# Patient Record
Sex: FEMALE | Race: WHITE | Employment: FULL TIME | URBAN - METROPOLITAN AREA
[De-identification: names, ages, dates, MRNs, and addresses within clinical notes are randomized per-mention and may not be internally consistent; named-entity substitution may affect disease eponyms.]

---

## 2017-07-26 ENCOUNTER — ED HISTORICAL/CONVERTED ENCOUNTER (OUTPATIENT)
Dept: OTHER | Age: 32
End: 2017-07-26

## 2017-09-25 ENCOUNTER — OFFICE VISIT (OUTPATIENT)
Dept: ENDOCRINOLOGY | Age: 32
End: 2017-09-25

## 2017-09-25 VITALS
DIASTOLIC BLOOD PRESSURE: 96 MMHG | HEIGHT: 71 IN | BODY MASS INDEX: 22.02 KG/M2 | HEART RATE: 77 BPM | TEMPERATURE: 98.2 F | WEIGHT: 157.3 LBS | OXYGEN SATURATION: 98 % | RESPIRATION RATE: 16 BRPM | SYSTOLIC BLOOD PRESSURE: 137 MMHG

## 2017-09-25 DIAGNOSIS — I15.9 SECONDARY HYPERTENSION: Primary | ICD-10-CM

## 2017-09-25 DIAGNOSIS — R79.89 ABNORMAL THYROID BLOOD TEST: ICD-10-CM

## 2017-09-25 RX ORDER — FLUVOXAMINE MALEATE 150 MG/1
CAPSULE, EXTENDED RELEASE ORAL
Refills: 2 | Status: ON HOLD | COMMUNITY
Start: 2017-08-29 | End: 2018-09-13

## 2017-09-25 RX ORDER — NORETHINDRONE 0.35 MG
KIT ORAL
Refills: 11 | COMMUNITY
Start: 2017-09-19

## 2017-09-25 RX ORDER — DESVENLAFAXINE SUCCINATE 50 MG/1
TABLET, EXTENDED RELEASE ORAL
Refills: 1 | COMMUNITY
Start: 2017-08-29

## 2017-09-25 NOTE — PROGRESS NOTES
Ivan Moise is a 36 y.o. female here for   Chief Complaint   Patient presents with    New Patient     referred by Dr. Gwynneth Osler for Thyroid issues       1. Have you been to the ER, urgent care clinic since your last visit? Hospitalized since your last visit? -n/a    2. Have you seen or consulted any other health care providers outside of the [address] [place] [address] since your last visit?   Include any pap smears or colon screening.-n/a    Wt Readings from Last 3 Encounters:   No data found for Wt     Temp Readings from Last 3 Encounters:   No data found for Temp     BP Readings from Last 3 Encounters:   No data found for BP     Pulse Readings from Last 3 Encounters:   No data found for Pulse     Order placed for pt per verbal order with read back from Dr. Kanu Walker 09/25/17

## 2017-09-25 NOTE — PROGRESS NOTES
[facility]               Balaji Forrest MD        [address] 78 [phone] Fax [phone]       Patient Information  Date:9/28/2017  Name : Mendel Bamberger 36 y.o.     YOB: 1985         Referred by: Comfort Calderon NP       Chief Complaint   Patient presents with   [facility] New Patient     referred by Dr. Darek Gaviria for Thyroid issues       History of Present Illness: Mendel Bamberger is a 36 y.o. female     She was referred by Comfort Calderon NP for evaluation and management of (thyroid disorder). She went to the ER for very high blood pressure associated with headache, palpitations and chest tightness. Her blood pressure was 180/110. She has not had any prior blood pressure issues. She has underlying bipolar disorder and is on multiple medications. She was also on estrogen birth control pills which has been recently changed to just the progesterone based pill. She was evaluated by Dr. Taye Garcia for evaluation of secondary hypertension. She had plasma metanephrines, plasma catecholamines which were negative, renin was 5 and aldosterone was 22 and drawn at 10:00 AM. No hypokalemia. Her morning cortisol was 18. Reportedly had doppler/renal ultrasound and she does not know the results. She was told she had abnormal thyroid function tests, the labs which were drawn by Dr. Tyae Garcia show normal TSH. She thinks it may be 5. A year ago she was told she had abnormal thyroid function tests which have normalized. She was wondering if her symptoms are related to her thyroid condition. She describes the symptoms as a panic attack. No family history of adrenal disorders. She denies licorice intake.        Past Medical History:   Diagnosis Date    Bipolar disorder [facility])      Past Surgical History:   Procedure Laterality Date    HX OTHER SURGICAL      birth edgar removal     Current Outpatient Prescriptions   Medication Sig    [facility] 150 mg CR cap TK 1 C PO QAM THEN TK 2 CS PO AT BEDTIME    desvenlafaxine succinate (PRISTIQ) 50 mg ER tablet TK 1 T PO D    NORLYDA 0.35 mg tab TK 1 T PO QD     No current facility-administered medications for this visit. Allergies   Allergen Reactions    Codeine-Cpm-Pe-Acetaminophen Other (comments)         Review of Systems:  All 10 systems reviewed and are negative other than mentioned in HPI    Physical Examination:  Blood pressure (!) 137/96, pulse 77, temperature 98.2 °F (36.8 °C), temperature source Oral, resp. rate 16, height 5' 10.5\" (1.791 m), weight 157 lb 4.8 oz (71.4 kg), SpO2 98 %. Body mass index is 22.25 kg/(m^2). - General: pleasant, no distress, good eye contact  - HEENT: no exopthalmos, no periorbital edema, no scleral/conjunctival injection, EOMI, no lid lag or stare  - Neck: supple, no thyromegaly, lymph nodes, or carotid bruits, no supraclavicular or dorsocervical fat pads  - Cardiovascular: regular, normal rate, normal S1 and S2,   - Respiratory: clear to auscultation bilaterally  - Gastrointestinal: soft, nontender, nondistended,BS +  - Musculoskeletal: no proximal muscle weakness in upper or lower extremities  - Integumentary:  no rashes, no edema  - Neurological: Alert and oriented, no tremor  - Psychiatric: normal mood and affect    Data Reviewed:     Assessment/Plan:     1. Secondary hypertension    2. Abnormal thyroid blood test      She was referred here for abnormal thyroid function tests, I do not have any labs from Myrna Taylor NP. The TSH from August 2017 was normal. I do not think thyroid is the cause for her symptoms. Her weight is fluctuating. Clinically, she does not have any consistent hypo or hyper thyroid symptoms. Check TSH along with free T4 and TPO. Also, free T4 assay scan may be affected by the psychotropic medications. Hypertension:  Being worked up as secondary hypertension, plasma metanephrines and catecholamines have been negative.  This is one of the most sensitive tests and even being on mood meds if it is negative, then pheochromocytoma has been ruled out. Renin seems to be higher, aldosterone is 22, ( She is not on any diuretics)  may have secondary hyperaldosteronism, suggesting renovascular hypertension. We will get Dr. López Brod advice on this. Had renal ultrasound with dopplers, requested the report. Agree with changing estrogen birth control pills to progestin based pills. Other etiology for secondary hyperaldosteronism is coarctation aorta. If all the workup has been negative and she continues to have hypertension, that needs to be ruled out. Thank you for allowing me to participate in the care of this patient. Tor Harley MD      There are no Patient Instructions on file for this visit.   Follow-up Disposition: Not on File        Patient verbalized understanding

## 2017-09-25 NOTE — MR AVS SNAPSHOT
Visit Information Date & Time Provider Department Dept. Phone Encounter #  
 9/25/2017  3:00 PM Chrissie Palacio MD [facility] [phone] [id number] Upcoming Health Maintenance Date Due DTaP/Tdap/Td series (1 - Tdap) 12/4/2008 PAP AKA CERVICAL CYTOLOGY 12/4/2008 INFLUENZA AGE 9 TO ADULT 8/1/2017 Allergies as of 9/25/2017  Review Complete On: 9/25/2017 By: Chrissie Palacio MD  
  
 Severity Noted Reaction Type Reactions Codeine-cpm-pe-acetaminophen  09/25/2017    Other (comments) Current Immunizations  Never Reviewed No immunizations on file. Not reviewed this visit You Were Diagnosed With   
  
 Codes Comments Secondary hypertension    -  Primary ICD-10-CM: I15.9 ICD-9-CM: 405.99 Vitals BP Pulse Temp Resp Height(growth percentile) Weight(growth percentile) (!) 137/96 (BP 1 Location: Left arm, BP Patient Position: Sitting) 77 98.2 °F (36.8 °C) (Oral) 16 5' 10.5\" (1.791 m) 157 lb 4.8 oz (71.4 kg) SpO2 BMI OB Status Smoking Status 98% 22.25 kg/m2 Having regular periods Never Smoker BMI and BSA Data Body Mass Index Body Surface Area  
 22.25 kg/m 2 1.88 m 2 Preferred Pharmacy Pharmacy Name Phone [facility], [address] [place] [address] [phone] Your Updated Medication List  
  
   
This list is accurate as of: 9/25/17  4:02 PM.  Always use your most recent med list.  
  
  
  
  
 desvenlafaxine succinate 50 mg ER tablet Commonly known as:  PRISTIQ TK 1 T PO D  
  
 fluvoxaMINE 150 mg CR cap Commonly known as:  Shonda Shines TK 1 C PO QAM THEN TK 2 CS PO AT BEDTIME  
  
 NORLYDA 0.35 mg Tab Generic drug:  norethindrone TK 1 T PO QD Introducing [facility]!    
 Munir Hughes introduces [facility] patient portal. Now you can access parts of your medical record, email your doctor's office, and request medication refills online. 1. In your internet browser, go to [email or web address] 2. Click on the First Time User? Click Here link in the Sign In box. You will see the New Member Sign Up page. 3. Enter your [employer]find Access Code exactly as it appears below. You will not need to use this code after youve completed the sign-up process. If you do not sign up before the expiration date, you must request a new code. · [employer]find Access Code: [email or web address] Expires: 12/24/2017  4:02 PM 
 
4. Enter the last four digits of your Social Security Number (xxxx) and Date of Birth (mm/dd/yyyy) as indicated and click Submit. You will be taken to the next sign-up page. 5. Create a Profind ID. This will be your Profind login ID and cannot be changed, so think of one that is secure and easy to remember. 6. Create a [employer]find password. You can change your password at any time. 7. Enter your Password Reset Question and Answer. This can be used at a later time if you forget your password. 8. Enter your e-mail address. You will receive e-mail notification when new information is available in [address] 9. Click Sign Up. You can now view and download portions of your medical record. 10. Click the Download Summary menu link to download a portable copy of your medical information. If you have questions, please visit the Frequently Asked Questions section of the Profind website. Remember, [employer]find is NOT to be used for urgent needs. For medical emergencies, dial 911. Now available from your iPhone and Android! Please provide this summary of care documentation to your next provider. Your primary care clinician is listed as Qi Bledsoe. If you have any questions after today's visit, please call [phone].

## 2017-09-26 LAB
T4 FREE SERPL-MCNC: 1.42 NG/DL (ref 0.82–1.77)
THYROPEROXIDASE AB SERPL-ACNC: 12 IU/ML (ref 0–34)
TSH SERPL DL<=0.005 MIU/L-ACNC: 1.45 UIU/ML (ref 0.45–4.5)

## 2017-09-28 PROBLEM — R79.89 ABNORMAL THYROID BLOOD TEST: Status: ACTIVE | Noted: 2017-09-28

## 2018-08-27 NOTE — PERIOP NOTES
[address] [facility], [address]    MAIN OR [phone]    MAIN PRE OP [phone]    AMBULATORY PRE OP [phone]    PRE-ADMISSION TESTING [phone]       Surgery Date:   Thursday 9/13/18      Is surgery arrival time given by surgeon? NO  If NO, [facility] staff will call you between 3 and 7pm the day before your surgery with your arrival time. (If your surgery is on a Monday, we will call you the Friday before.)    Call [phone] after 7pm Monday-Friday if you did not receive your arrival time.     Answers to Common Questions   When You  Arrive   Arrive at the [facility] [address] [facility] on the day of your surgery  Have your insurance card, photo ID, and any copayment (if needed)     Food   and   Drink NO food or drink after midnight the night before surgery    This means NO water, gum, mints, coffee, juice, etc.  No alcohol (beer, wine, liquor) 24 hours before and after surgery     Medicine to   TAKE   Morning of Surgery   MEDICATIONS TO TAKE THE MORNING OF SURGERY WITH A SIP OF WATER:    ***     Medicine  To  STOP FOR PAIN   NO Aspirin for pain    NO Non-Steroidal Anti-Inflammatory Drugs (NSAIDs:   for example, Ibuprofen (Advil, Motrin), Naproxen (Aleve)   You can take Tylenol  follow instructions on the bottle   STOP herbal supplements and vitamins 1 week before surgery     Blood  Thinners    If you take Aspirin, Plavix, Coumadin, blood-thinning or anti-clot medicine, talk to your surgeon and/or follow the instructions from the doctor who told you to take that medicine     Clothing  Jewelry  Valuables  Bathing   CLOTHING   Wear loose, comfortable clothes   Wear glasses instead of contacts   Leave money, jewelry and valuables at home   No make-up, particularly mascara, the day of surgery   REMOVE ALL piercings, rings, and jewelry - leave at home   Wear hair loose or down; no pony-tails, buns, or metal hair clips    BATHING   Follow all special bath instructions (for total joint replacement, spine and bowel surgeries.)   If you shower the morning of surgery, please do not apply any lotions, powders, or deodorants afterwards. Do not shave or trim anywhere 24 hours before surgery. Going Home  or  Spending the Night    SAME-DAY SURGERY: You must have a responsible adult drive you home and stay with you 24 hours after surgery   ADMITS: If your doctor is keeping you into the hospital after surgery, leave personal belongings/luggage in your car until you have a hospital room number. Hospital discharge time is 12 noon  Drivers must be here before 12 noon unless you are told differently         Follow all instructions so your surgery wont be cancelled. Please, be on time. If a situation occurs and you are delayed the day of surgery, call [phone] or [phone]. If your physical condition changes (like a fever, cold, flu, etc.) call your surgeon as soon as possible. The Preadmission Testing staff can be reached at [phone]. OTHER SPECIAL INSTRUCTIONS:  Free  parking 7a-5p. The patient was contacted  via phone. She  verbalize  understanding of all instructions and does not  need reinforcement.

## 2018-09-12 ENCOUNTER — ANESTHESIA EVENT (OUTPATIENT)
Dept: SURGERY | Age: 33
End: 2018-09-12
Payer: SELF-PAY

## 2018-09-13 ENCOUNTER — HOSPITAL ENCOUNTER (OUTPATIENT)
Age: 33
Setting detail: OUTPATIENT SURGERY
Discharge: HOME | End: 2018-09-13
Attending: SURGERY | Admitting: SURGERY
Payer: SELF-PAY

## 2018-09-13 ENCOUNTER — ANESTHESIA (OUTPATIENT)
Dept: SURGERY | Age: 33
End: 2018-09-13
Payer: SELF-PAY

## 2018-09-13 VITALS
RESPIRATION RATE: 16 BRPM | WEIGHT: 155 LBS | DIASTOLIC BLOOD PRESSURE: 55 MMHG | OXYGEN SATURATION: 98 % | HEART RATE: 85 BPM | SYSTOLIC BLOOD PRESSURE: 121 MMHG | TEMPERATURE: 98.2 F | HEIGHT: 71 IN | BODY MASS INDEX: 21.7 KG/M2

## 2018-09-13 LAB — HCG UR QL: NEGATIVE

## 2018-09-13 PROCEDURE — 77030011825 HC SUPP SURG PSTOP S2SG -B: Performed by: SURGERY

## 2018-09-13 PROCEDURE — 77030002933 HC SUT MCRYL J&J -A: Performed by: SURGERY

## 2018-09-13 PROCEDURE — 77030008463 HC STPLR SKN PROX J&J -B: Performed by: SURGERY

## 2018-09-13 PROCEDURE — 74011250636 HC RX REV CODE- 250/636

## 2018-09-13 PROCEDURE — 77030039266 HC ADH SKN EXOFIN S2SG -A: Performed by: SURGERY

## 2018-09-13 PROCEDURE — 76210000026 HC REC RM PH II 1 TO 1.5 HR: Performed by: SURGERY

## 2018-09-13 PROCEDURE — 77030002924 HC SUT GORTX WLGO -B: Performed by: SURGERY

## 2018-09-13 PROCEDURE — 74011000272 HC RX REV CODE- 272: Performed by: SURGERY

## 2018-09-13 PROCEDURE — 76010000149 HC OR TIME 1 TO 1.5 HR: Performed by: SURGERY

## 2018-09-13 PROCEDURE — 74011000250 HC RX REV CODE- 250: Performed by: SURGERY

## 2018-09-13 PROCEDURE — 77030018836 HC SOL IRR NACL ICUM -A: Performed by: SURGERY

## 2018-09-13 PROCEDURE — 76210000063 HC OR PH I REC FIRST 0.5 HR: Performed by: SURGERY

## 2018-09-13 PROCEDURE — 77030016570 HC BLNKT BAIR HGGR 3M -B: Performed by: SURGERY

## 2018-09-13 PROCEDURE — 74011250637 HC RX REV CODE- 250/637: Performed by: SURGERY

## 2018-09-13 PROCEDURE — 74011250636 HC RX REV CODE- 250/636: Performed by: SURGERY

## 2018-09-13 PROCEDURE — 77030011283 HC ELECTRD NDL COVD -A: Performed by: SURGERY

## 2018-09-13 PROCEDURE — 77030032490 HC SLV COMPR SCD KNE COVD -B: Performed by: SURGERY

## 2018-09-13 PROCEDURE — 77030020262 HC SOL INJ SOD CL 0.9% 100ML: Performed by: SURGERY

## 2018-09-13 PROCEDURE — 74011000258 HC RX REV CODE- 258: Performed by: SURGERY

## 2018-09-13 PROCEDURE — 74011250636 HC RX REV CODE- 250/636: Performed by: ANESTHESIOLOGY

## 2018-09-13 PROCEDURE — 74011250637 HC RX REV CODE- 250/637: Performed by: ANESTHESIOLOGY

## 2018-09-13 PROCEDURE — 77030026227 HC FUNL KELLR 2 PCH ALGN -C: Performed by: SURGERY

## 2018-09-13 PROCEDURE — 77030011264 HC ELECTRD BLD EXT COVD -A: Performed by: SURGERY

## 2018-09-13 PROCEDURE — 77030008684 HC TU ET CUF COVD -B: Performed by: ANESTHESIOLOGY

## 2018-09-13 PROCEDURE — 77030003592 HC NDL KEITH ASPN -A: Performed by: SURGERY

## 2018-09-13 PROCEDURE — 81025 URINE PREGNANCY TEST: CPT

## 2018-09-13 PROCEDURE — 77030002966 HC SUT PDS J&J -A: Performed by: SURGERY

## 2018-09-13 PROCEDURE — 77030020782 HC GWN BAIR PAWS FLX 3M -B

## 2018-09-13 PROCEDURE — 76060000033 HC ANESTHESIA 1 TO 1.5 HR: Performed by: SURGERY

## 2018-09-13 PROCEDURE — 74011000250 HC RX REV CODE- 250

## 2018-09-13 DEVICE — NATRELLE INSPIRA STY SRM-445 MODERATE PROFILE  SMOOTH ROUND SILICONE
Type: IMPLANTABLE DEVICE | Site: BREAST | Status: FUNCTIONAL
Brand: NATRELLE INSPIRA BREAST IMPLANTS

## 2018-09-13 RX ORDER — SODIUM CHLORIDE 0.9 % (FLUSH) 0.9 %
5-10 SYRINGE (ML) INJECTION EVERY 8 HOURS
Status: DISCONTINUED | OUTPATIENT
Start: 2018-09-13 | End: 2018-09-13 | Stop reason: HOSPADM

## 2018-09-13 RX ORDER — SODIUM CHLORIDE 0.9 % (FLUSH) 0.9 %
5-10 SYRINGE (ML) INJECTION AS NEEDED
Status: DISCONTINUED | OUTPATIENT
Start: 2018-09-13 | End: 2018-09-13 | Stop reason: HOSPADM

## 2018-09-13 RX ORDER — DEXAMETHASONE SODIUM PHOSPHATE 4 MG/ML
INJECTION, SOLUTION INTRA-ARTICULAR; INTRALESIONAL; INTRAMUSCULAR; INTRAVENOUS; SOFT TISSUE AS NEEDED
Status: DISCONTINUED | OUTPATIENT
Start: 2018-09-13 | End: 2018-09-13 | Stop reason: HOSPADM

## 2018-09-13 RX ORDER — MIDAZOLAM HYDROCHLORIDE 1 MG/ML
INJECTION, SOLUTION INTRAMUSCULAR; INTRAVENOUS AS NEEDED
Status: DISCONTINUED | OUTPATIENT
Start: 2018-09-13 | End: 2018-09-13 | Stop reason: HOSPADM

## 2018-09-13 RX ORDER — CEFAZOLIN SODIUM/WATER 2 G/20 ML
2 SYRINGE (ML) INTRAVENOUS ONCE
Status: COMPLETED | OUTPATIENT
Start: 2018-09-13 | End: 2018-09-13

## 2018-09-13 RX ORDER — BUPROPION HYDROCHLORIDE 150 MG/1
150 TABLET ORAL
COMMUNITY

## 2018-09-13 RX ORDER — BUPIVACAINE HYDROCHLORIDE AND EPINEPHRINE 5; 5 MG/ML; UG/ML
INJECTION, SOLUTION EPIDURAL; INTRACAUDAL; PERINEURAL AS NEEDED
Status: DISCONTINUED | OUTPATIENT
Start: 2018-09-13 | End: 2018-09-13 | Stop reason: HOSPADM

## 2018-09-13 RX ORDER — LIDOCAINE HYDROCHLORIDE 10 MG/ML
0.1 INJECTION, SOLUTION EPIDURAL; INFILTRATION; INTRACAUDAL; PERINEURAL AS NEEDED
Status: DISCONTINUED | OUTPATIENT
Start: 2018-09-13 | End: 2018-09-13 | Stop reason: HOSPADM

## 2018-09-13 RX ORDER — PROPOFOL 10 MG/ML
INJECTION, EMULSION INTRAVENOUS AS NEEDED
Status: DISCONTINUED | OUTPATIENT
Start: 2018-09-13 | End: 2018-09-13 | Stop reason: HOSPADM

## 2018-09-13 RX ORDER — ONDANSETRON 2 MG/ML
4 INJECTION INTRAMUSCULAR; INTRAVENOUS AS NEEDED
Status: DISCONTINUED | OUTPATIENT
Start: 2018-09-13 | End: 2018-09-13 | Stop reason: HOSPADM

## 2018-09-13 RX ORDER — FENTANYL CITRATE 50 UG/ML
INJECTION, SOLUTION INTRAMUSCULAR; INTRAVENOUS AS NEEDED
Status: DISCONTINUED | OUTPATIENT
Start: 2018-09-13 | End: 2018-09-13 | Stop reason: HOSPADM

## 2018-09-13 RX ORDER — SODIUM CHLORIDE, SODIUM LACTATE, POTASSIUM CHLORIDE, CALCIUM CHLORIDE 600; 310; 30; 20 MG/100ML; MG/100ML; MG/100ML; MG/100ML
125 INJECTION, SOLUTION INTRAVENOUS CONTINUOUS
Status: DISCONTINUED | OUTPATIENT
Start: 2018-09-13 | End: 2018-09-13 | Stop reason: HOSPADM

## 2018-09-13 RX ORDER — GLYCOPYRROLATE 0.2 MG/ML
INJECTION INTRAMUSCULAR; INTRAVENOUS AS NEEDED
Status: DISCONTINUED | OUTPATIENT
Start: 2018-09-13 | End: 2018-09-13 | Stop reason: HOSPADM

## 2018-09-13 RX ORDER — POVIDONE-IODINE 10 %
SOLUTION, NON-ORAL TOPICAL AS NEEDED
Status: DISCONTINUED | OUTPATIENT
Start: 2018-09-13 | End: 2018-09-13 | Stop reason: HOSPADM

## 2018-09-13 RX ORDER — ONDANSETRON 2 MG/ML
INJECTION INTRAMUSCULAR; INTRAVENOUS AS NEEDED
Status: DISCONTINUED | OUTPATIENT
Start: 2018-09-13 | End: 2018-09-13 | Stop reason: HOSPADM

## 2018-09-13 RX ORDER — SODIUM CHLORIDE, SODIUM LACTATE, POTASSIUM CHLORIDE, CALCIUM CHLORIDE 600; 310; 30; 20 MG/100ML; MG/100ML; MG/100ML; MG/100ML
100 INJECTION, SOLUTION INTRAVENOUS CONTINUOUS
Status: DISCONTINUED | OUTPATIENT
Start: 2018-09-13 | End: 2018-09-13 | Stop reason: HOSPADM

## 2018-09-13 RX ORDER — OXYCODONE AND ACETAMINOPHEN 5; 325 MG/1; MG/1
2 TABLET ORAL
Status: COMPLETED | OUTPATIENT
Start: 2018-09-13 | End: 2018-09-13

## 2018-09-13 RX ORDER — DIPHENHYDRAMINE HYDROCHLORIDE 50 MG/ML
12.5 INJECTION, SOLUTION INTRAMUSCULAR; INTRAVENOUS AS NEEDED
Status: DISCONTINUED | OUTPATIENT
Start: 2018-09-13 | End: 2018-09-13 | Stop reason: HOSPADM

## 2018-09-13 RX ORDER — SCOLOPAMINE TRANSDERMAL SYSTEM 1 MG/1
1 PATCH, EXTENDED RELEASE TRANSDERMAL
Status: DISCONTINUED | OUTPATIENT
Start: 2018-09-13 | End: 2018-09-13 | Stop reason: HOSPADM

## 2018-09-13 RX ORDER — NEOSTIGMINE METHYLSULFATE 1 MG/ML
INJECTION INTRAVENOUS AS NEEDED
Status: DISCONTINUED | OUTPATIENT
Start: 2018-09-13 | End: 2018-09-13 | Stop reason: HOSPADM

## 2018-09-13 RX ORDER — EPHEDRINE SULFATE 50 MG/ML
INJECTION, SOLUTION INTRAVENOUS AS NEEDED
Status: DISCONTINUED | OUTPATIENT
Start: 2018-09-13 | End: 2018-09-13 | Stop reason: HOSPADM

## 2018-09-13 RX ORDER — HYDROMORPHONE HYDROCHLORIDE 1 MG/ML
.25-1 INJECTION, SOLUTION INTRAMUSCULAR; INTRAVENOUS; SUBCUTANEOUS
Status: DISCONTINUED | OUTPATIENT
Start: 2018-09-13 | End: 2018-09-13 | Stop reason: HOSPADM

## 2018-09-13 RX ORDER — ROCURONIUM BROMIDE 10 MG/ML
INJECTION, SOLUTION INTRAVENOUS AS NEEDED
Status: DISCONTINUED | OUTPATIENT
Start: 2018-09-13 | End: 2018-09-13 | Stop reason: HOSPADM

## 2018-09-13 RX ORDER — LIDOCAINE HYDROCHLORIDE 20 MG/ML
INJECTION, SOLUTION EPIDURAL; INFILTRATION; INTRACAUDAL; PERINEURAL AS NEEDED
Status: DISCONTINUED | OUTPATIENT
Start: 2018-09-13 | End: 2018-09-13 | Stop reason: HOSPADM

## 2018-09-13 RX ADMIN — FENTANYL CITRATE 50 MCG: 50 INJECTION, SOLUTION INTRAMUSCULAR; INTRAVENOUS at 08:21

## 2018-09-13 RX ADMIN — ROCURONIUM BROMIDE 40 MG: 10 INJECTION, SOLUTION INTRAVENOUS at 08:06

## 2018-09-13 RX ADMIN — ROCURONIUM BROMIDE 10 MG: 10 INJECTION, SOLUTION INTRAVENOUS at 08:33

## 2018-09-13 RX ADMIN — LIDOCAINE HYDROCHLORIDE 40 MG: 20 INJECTION, SOLUTION EPIDURAL; INFILTRATION; INTRACAUDAL; PERINEURAL at 08:06

## 2018-09-13 RX ADMIN — PROPOFOL 200 MG: 10 INJECTION, EMULSION INTRAVENOUS at 08:06

## 2018-09-13 RX ADMIN — GLYCOPYRROLATE 0.2 MG: 0.2 INJECTION INTRAMUSCULAR; INTRAVENOUS at 09:08

## 2018-09-13 RX ADMIN — FENTANYL CITRATE 50 MCG: 50 INJECTION, SOLUTION INTRAMUSCULAR; INTRAVENOUS at 08:33

## 2018-09-13 RX ADMIN — DEXAMETHASONE SODIUM PHOSPHATE 8 MG: 4 INJECTION, SOLUTION INTRA-ARTICULAR; INTRALESIONAL; INTRAMUSCULAR; INTRAVENOUS; SOFT TISSUE at 08:21

## 2018-09-13 RX ADMIN — MIDAZOLAM HYDROCHLORIDE 1 MG: 1 INJECTION, SOLUTION INTRAMUSCULAR; INTRAVENOUS at 08:06

## 2018-09-13 RX ADMIN — MIDAZOLAM HYDROCHLORIDE 4 MG: 1 INJECTION, SOLUTION INTRAMUSCULAR; INTRAVENOUS at 08:03

## 2018-09-13 RX ADMIN — Medication 2 G: at 08:17

## 2018-09-13 RX ADMIN — NEOSTIGMINE METHYLSULFATE 2 MG: 1 INJECTION INTRAVENOUS at 09:08

## 2018-09-13 RX ADMIN — FENTANYL CITRATE 100 MCG: 50 INJECTION, SOLUTION INTRAMUSCULAR; INTRAVENOUS at 08:06

## 2018-09-13 RX ADMIN — OXYCODONE HYDROCHLORIDE AND ACETAMINOPHEN 2 TABLET: 5; 325 TABLET ORAL at 10:25

## 2018-09-13 RX ADMIN — SODIUM CHLORIDE, SODIUM LACTATE, POTASSIUM CHLORIDE, AND CALCIUM CHLORIDE: 600; 310; 30; 20 INJECTION, SOLUTION INTRAVENOUS at 09:05

## 2018-09-13 RX ADMIN — SODIUM CHLORIDE, SODIUM LACTATE, POTASSIUM CHLORIDE, AND CALCIUM CHLORIDE 100 ML/HR: 600; 310; 30; 20 INJECTION, SOLUTION INTRAVENOUS at 07:27

## 2018-09-13 RX ADMIN — FENTANYL CITRATE 50 MCG: 50 INJECTION, SOLUTION INTRAMUSCULAR; INTRAVENOUS at 08:55

## 2018-09-13 RX ADMIN — ONDANSETRON 4 MG: 2 INJECTION INTRAMUSCULAR; INTRAVENOUS at 08:21

## 2018-09-13 RX ADMIN — EPHEDRINE SULFATE 20 MG: 50 INJECTION, SOLUTION INTRAVENOUS at 08:54

## 2018-09-13 NOTE — H&P
History and Physical 
 
Patient is a 29 y.o. well-developed, well nourished female who presents for bilateral breast augmentation. Past Medical History:  
Diagnosis Date  Bipolar disorder ([address]  H/O seasonal allergies  Nausea & vomiting Past Surgical History:  
Procedure Laterality Date  HX HEENT    
 tongue clipping at 5 yrs old  HX OTHER SURGICAL    
 birth edgar removal  
 
Prior to Admission medications Medication Sig Start Date End Date Taking? Authorizing Provider buPROPion XL (WELLBUTRIN XL) 150 mg tablet Take 150 mg by mouth every morning. Yes Historical Provider  
desvenlafaxine succinate (PRISTIQ) 50 mg ER tablet TK 1 T PO D 8/29/17  Yes Historical Provider NORLYDA 0.35 mg tab TK 1 T PO QD 9/19/17  Yes Historical Provider Allergies Allergen Reactions  Codeine-Cpm-Pe-Acetaminophen Other (comments) Hyper active General:   No apparent distress. Heart:  Regular rate and rhythm without murmurs or rubs. Lungs:  Clear to ausculation bilaterally; no wheezes, rales or rhonchi. Patient is ready to go to surgery. Sole De La Rosa MD 
9/13/2018

## 2018-09-13 NOTE — ANESTHESIA POSTPROCEDURE EVALUATION
Post-Anesthesia Evaluation and Assessment Patient: Chavo Adler MRN: [id number]  SSN: [id number] YOB: 1985  Age: 29 y.o. Sex: female Cardiovascular Function/Vital Signs Visit Vitals  /55  Pulse 85  Temp 36.8 °C (98.2 °F)  Resp 16  
 Ht 5' 11\" (1.803 m)  Wt 70.3 kg (155 lb)  SpO2 98%  BMI 21.62 kg/m2 Patient is status post general anesthesia for Procedure(s): BILATERAL BREAST AUGMENTATION WITH SILICONE. Nausea/Vomiting: None Postoperative hydration reviewed and adequate. Pain: 
Pain Scale 1: Numeric (0 - 10) (09/13/18 0948) Pain Intensity 1: 0 (09/13/18 0948) Managed Neurological Status:  
Neuro (WDL): Exceptions to [facility] (09/13/18 6901) Neuro Neurologic State: Alert;Drowsy; Eyes open spontaneously (09/13/18 0948) Orientation Level: Oriented to person;Oriented to place;Oriented to situation (09/13/18 4355) Cognition: Follows commands (09/13/18 8806) LUE Motor Response: Purposeful;Spontaneous  (09/13/18 0948) LLE Motor Response: Purposeful;Spontaneous  (09/13/18 0948) RUE Motor Response: Purposeful;Spontaneous  (09/13/18 0948) RLE Motor Response: Purposeful;Spontaneous  (09/13/18 0948) At baseline Mental Status and Level of Consciousness: Arousable Pulmonary Status:  
O2 Device: Room air (09/13/18 0948) Adequate oxygenation and airway patent Complications related to anesthesia: None Post-anesthesia assessment completed. No concerns Signed By: Frankie Cuevas MD   
 September 13, 2018

## 2018-09-13 NOTE — DISCHARGE INSTRUCTIONS
Breast Augmentation Patient Instructions    Please remember to come to your follow up appt in the office tomorrow    Immediately Following Surgery:    After surgery you will rest quietly for the first 48 hours. You will be able to walk around the house and perform light daily activities; however, during this time, it is not at all unusual for you to feel some pain, soreness and pressure in the chest area. This will gradually subside and Dr. Ritesh Stein will give you pain medication to relieve it. You must take the entire prescription of antibiotics. Be sure to lie on your back whenever you rest or sleep. Keep your head elevated for 72 hours after surgery as this will help with swelling. The surgery bra that you have been put in should be worn constantly during the day and at night. Dr. Ritesh Stein will see you in the office the day after surgery to check your progress. You will then be allowed to shower two days after surgery and change the bra as needed. When taking a shower, remove the bra and take off the gauze. The small white tapes that are under the gauze directly over your incision should be left on. Wash yourself everywhere, including the tapes and your incisions. Use mild soap and pat yourself dry and put the bra back on. You dont need to cover the small white tapes over your incision. This daily routine will help keep the incisions clean, and will promote wound healing. Do not submerge yourself in a bath, swimming pool, or whirlpool for two weeks. You will wear the sports bra for a total of two to three weeks after surgery. The small tape strips covering your incisions. These will remain in place for seven days and will peel off by themselves. A few patients react to the anesthetic after surgery with nausea and vomiting. This usually lasts less than 24 hours and should be treated with lots of fluids.   Dr. Ritesh Stein will prescribe nausea medicine for during your preoperative visit.    The maximum swelling occurs at about three days and then begins to dramatically improve. Mild bruising typically resolves within 14 days. You should plan to be off work for up to five to seven days, although this can vary from person to person. Additional Post-Operative Instructions:    No heavy exercise or lifting for three weeks following surgery. This will allow the implants to remain in the proper position without movement. For the first three days following surgery you should try to restrict your arm movements. Move your arms slowly and avoid sudden jerky movements of the chest and breast area. Keep your arms as close to your body as possible. This allows the implants to remain in place. Dr. Viola Rainey encourages walking immediately after surgery. This activity will greatly minimize the risk of blood clots in your leg veins. Do not expose your breasts to the sun for eight weeks after surgery. Please notify Dr. Viola Rainey if:   If your one breast becomes significantly larger than the other   If you develop significant bruising across the chest    If you experience a significant increase in pain in the breast after the pain has improved   If you develop a temperature above 101.5° F   If you develop redness (like a sunburn) around your incisions  If you need help or have any questions feel free to call Dr Viola Rainey with your concerns. Dr. Viola Rainey is on call 24 hours per day, seven days per week and has an answering service to forward calls. Breast Massage Following Breast Augmentation   You will be taught how to perform the breast massage exercises during your post operative visits. The purpose of these exercises is to keep the scar tissue that forms around implants as soft as possible.   By performing these exercises as described, you can help soften the internal scar, minimize the risk of significant capsular contracture and maximize the likelihood of a soft, natural feel and appearance to your breast.    Begin doing the exercises two weeks after surgery. Dr. Saez will show you the technique during your second post-operative visit. It is important to remember that slow steady massage is more effective than quick jerky movements. Don't worry about injuring the implant; you cannot cause a rupture with these exercises.  Press the breasts slowly and maximally inwards (towards your breast bone) and hold for 10 seconds, then release. Repeat four times.  Press the breasts apart slowly and maximally outwards and hold for 10 seconds, then release. Repeat four times.  Repeat for downward movement.  Repeat for upward movement.  Perform these exercises four times per day for the first three months. The quality of your cosmetic enhancement may be compromised if you fail to return for any scheduled post-op visits, or follow the pre- and post-operative instructions. Please dont hesitate to report any unusual or concerning changes. Our number is [phone]. DISCHARGE SUMMARY from your Nurse    The following personal items collected during your admission are returned to you:   Dental Appliance: Dental Appliances: None  Vision:    Hearing Aid:    Jewelry: Jewelry: None  Clothing: Clothing: Footwear, Undergarments, Shirt, Pants  Other Valuables: Other Valuables: None  Valuables sent to safe:      PATIENT INSTRUCTIONS:    After general anesthesia or intravenous sedation, for 24 hours or while taking prescription Narcotics:  · Limit your activities  · Do not drive and operate hazardous machinery  · Do not make important personal or business decisions  · Do  not drink alcoholic beverages  · If you have not urinated within 8 hours after discharge, please contact your surgeon on call.     Report the following to your surgeon:  · Excessive pain, swelling, redness or odor of or around the surgical area  · Temperature over 100.5  · Nausea and vomiting lasting longer than 4 hours or if unable to take medications  · Any signs of decreased circulation or nerve impairment to extremity: change in color, persistent  numbness, tingling, coldness or increase pain  · Any questions    COUGH AND DEEP BREATHE    Breathing deep and coughing are very important exercises to do after surgery. Deep breathing and coughing open the little air tubes and air sacks in your lungs. You take deep breaths every day. You may not even notice - it is just something you do when you sigh or yawn. It is a natural exercise you do to keep these air passages open. After surgery, take deep breaths and cough, on purpose. Coughing and deep breathing help prevent bronchitis and pneumonia after surgery. If you had chest or belly surgery, use a pillow as a \"hug ricardo\" and hold it tightly to your chest or belly when you cough. DIRECTIONS:  6. Take 10 to 15 slow deep breaths every hour while awake. 7. Breathe in deeply, and hold it for 2 seconds. 8. Exhale slowly through puckered lips, like blowing up a balloon. 9. After every 4th or 5th deep breath, hug your pillow to your chest or belly and give a hard, deep cough. Yes, it will probably hurt. But doing this exercise is very important part of healing after surgery. Take your pain medicine to help you do this exercise without too much pain. IF YOU HAVE BEEN DIAGNOSED WITH SLEEP APNEA, PLEASE USE YOUR SLEEP APNEA DEVICE OR CPAP MACHINE WHEN YOU INTEND TO NAP AFTER TAKING PAIN MEDICATION. Ankle Pumps    Ankle pumps increase the circulation of oxygenated blood to your lower extremities and decrease your risk for circulation problems such as blood clots. They also stretch the muscles, tendons and ligaments in your foot and ankle, and prevent joint contracture in the ankle and foot, especially after surgeries on the legs.     It is important to do ankle pump exercises regularly after surgery because immobility increases your risk for developing a blood clot.  Your doctor may also have you take an Aspirin for the next few days as well. If your doctor did not ask you to take an Aspirin, consult with him before starting Aspirin therapy on your own. Slowly point your foot forward, feeling the muscles on the top of your lower leg stretch, and hold this position for 5 seconds. Next, pull your foot back toward you as far as possible, stretching the calf muscles, and hold that position for 5 seconds. Repeat with the other foot. Perform 10 repetitions every hour while awake for both ankles if possible (down and then up with the foot once is one repetition). You should feel gentle stretching of the muscles in your lower leg when doing this exercise. If you feel pain, or your range of motion is limited, don't  Push too hard. Only go the limit your joint and muscles will let you go. If you have increasing pain, progressively worsening leg warmth or swelling, STOP the exercise and call your doctor. Below is information about the medications your doctor is prescribing after your visit:    Other information in your discharge envelope:  []     PRESCRIPTIONS  []     PHYSICAL THERAPY PRESCRIPTION  []     APPOINTMENT CARDS  []     Regional Anesthesia Pamphlet for block or block with On-Q Catheter from Anesthesia Service  []     Medical device information sheets/pamphlets from their    []     School/work excuse note. []     /parent work excuse note. These are general instructions for a healthy lifestyle:    *  Please give a list of your current medications to your Primary Care Provider. *  Please update this list whenever your medications are discontinued, doses are      changed, or new medications (including over-the-counter products) are added. *  Please carry medication information at all times in case of emergency situations.     About Smoking  No smoking / No tobacco products / Avoid exposure to second hand smoke    Surgeon General's Warning:  Quitting smoking now greatly reduces serious risk to your health. Obesity, smoking, and sedentary lifestyle greatly increases your risk for illness and disease. A healthy diet, regular physical exercise & weight monitoring are important for maintaining a healthy lifestyle. Congestive Heart Failure  You may be retaining fluid if you have a history of heart failure or if you experience any of the following symptoms:  Weight gain of 3 pounds or more overnight or 5 pounds in a week, increased swelling in our hands or feet or shortness of breath while lying flat in bed. Please call your doctor as soon as you notice any of these symptoms; do not wait until your next office visit. Recognize signs and symptoms of STROKE:  F - face looks uneven  A - arms unable to move or move even  S - speech slurred or non-existent  T - time-call 911 as soon as signs and symptoms begin-DO NOT go         Back to bed or wait to see if you get better-TIME IS BRAIN. Warning signs of HEART ATTACK  Call 911 if you have these symptoms    · Chest discomfort. Most heart attacks involve discomfort in the center of the chest that lasts more than a few minutes, or that goes away and comes back. It can feel like uncomfortable pressure, squeezing, fullness, or pain. · Discomfort in other areas of the upper body. Symptoms can include pain or discomfort in one or both        Arms, the back, neck, jaw, or stomach. ·  Shortness of breath with or without chest discomfort. · Other signs may include breaking out in a cold sweat, nausea, or lightheadedness    Don't wait more than five minutes to call 911 - MINUTES MATTER! Fast action can save your life. Calling 911 is almost always the fastest way to get lifesaving treatment. Emergency Medical Services staff can begin treatment when they arrive - up to an hour sooner than if someone gets to the hospital by car.

## 2018-09-13 NOTE — BRIEF OP NOTE
BRIEF OPERATIVE NOTE Date of Procedure: 9/13/2018 Preoperative Diagnosis: COSMETIC Postoperative Diagnosis: COSMETIC Procedure(s): BILATERAL BREAST AUGMENTATION WITH SILICONE Surgeon(s) and Role: Tico Taylor MD - Primary Surgical Assistant: kelly lowe Surgical Staff: 
Circ-1: Robert Bowen RN 
Circ-Intern: Ousmane Zepeda RN Physician Assistant: Naresh Villanueva PA-C Scrub Tech-1: Tyrell Haro Scrub Tech-Relief: Ari Bill Float Staff: Shiela Rice RN Event Time In Incision Start 7644 Incision Close 0912 Anesthesia: General  
Estimated Blood Loss: 0 Specimens: * No specimens in log * Findings: 0 Complications: 0 Implants:  
Implant Name Type Inv. Item Serial No.  Lot No. LRB No. Used Action IMPL BRST SMTH MP GEL 445ML -- INSPIRA - [id number] Breast IMPL BRST SMTH MP GEL 445ML -- INSPIRA [id number] [employer] [id number] NA Left 1 Implanted IMPL BRST SMTH MP GEL 445ML -- INSPIRA - [id number] Breast IMPL BRST SMTH MP GEL 445ML -- INSPIRA [id number] [employer] [id number] NA Right 1 Implanted

## 2018-09-13 NOTE — ANESTHESIA PREPROCEDURE EVALUATION
Anesthetic History PONV Review of Systems / Medical History Patient summary reviewed and pertinent labs reviewed Pulmonary Within defined limits Neuro/Psych Psychiatric history Cardiovascular Within defined limits Exercise tolerance: >4 METS 
  
GI/Hepatic/Renal 
Within defined limits Endo/Other Within defined limits Other Findings Physical Exam 
 
Airway Mallampati: II 
TM Distance: 4 - 6 cm Neck ROM: normal range of motion Mouth opening: Normal 
 
 Cardiovascular Regular rate and rhythm,  S1 and S2 normal,  no murmur, click, rub, or gallop Rhythm: regular Rate: normal 
 
 
 
 Dental 
No notable dental hx Pulmonary Breath sounds clear to auscultation Abdominal 
GI exam deferred Other Findings Anesthetic Plan ASA: 2 Anesthesia type: general 
 
 
 
 
Induction: Intravenous Anesthetic plan and risks discussed with: Patient

## 2018-09-13 NOTE — IP AVS SNAPSHOT
Miracle Gallagher 
 
 
 [address] [facility] 
[phone] Patient: Yasmin Vasquez MRN: [id number] ABO:[birth year] About your hospitalization You were admitted on:  September 13, 2018 You last received care in the:  [facility] PACU You were discharged on:  September 13, 2018 Why you were hospitalized Your primary diagnosis was:  Not on File Follow-up Information Follow up With Details Comments Contact Info Geronimo Elizondo NP   [address] [address] 
[phone] Maxim Valerio MD  see tomorrow as scheduled [address] [facility] [address] [place] [address] 
[phone] Discharge Orders None A check edgar indicates which time of day the medication should be taken. My Medications ASK your doctor about these medications Instructions Each Dose to Equal  
 Morning Noon Evening Bedtime buPROPion  mg tablet Commonly known as:  Zulma Bannister Your last dose was: Your next dose is: Take 150 mg by mouth every morning. 150 mg  
    
   
   
   
  
 desvenlafaxine succinate 50 mg ER tablet Commonly known as:  PRISTIQ Your last dose was: Your next dose is:    
   
   
 TK 1 T PO D  
     
   
   
   
  
 NORLYDA 0.35 mg Tab Generic drug:  norethindrone Your last dose was: Your next dose is:    
   
   
 TK 1 T PO QD Discharge Instructions Breast Augmentation Patient Instructions Please remember to come to your follow up appt in the office tomorrow Immediately Following Surgery: After surgery you will rest quietly for the first 48 hours. You will be able to walk around the house and perform light daily activities; however, during this time, it is not at all unusual for you to feel some pain, soreness and pressure in the chest area.   This will gradually subside and Dr. Irving Currie will give you pain medication to relieve it. You must take the entire prescription of antibiotics. Be sure to lie on your back whenever you rest or sleep. Keep your head elevated for 72 hours after surgery as this will help with swelling. The surgery bra that you have been put in should be worn constantly during the day and at night. Dr. Irving Currie will see you in the office the day after surgery to check your progress. You will then be allowed to shower two days after surgery and change the bra as needed. When taking a shower, remove the bra and take off the gauze. The small white tapes that are under the gauze directly over your incision should be left on. Wash yourself everywhere, including the tapes and your incisions. Use mild soap and pat yourself dry and put the bra back on. You dont need to cover the small white tapes over your incision. This daily routine will help keep the incisions clean, and will promote wound healing. Do not submerge yourself in a bath, swimming pool, or whirlpool for two weeks. You will wear the sports bra for a total of two to three weeks after surgery. The small tape strips covering your incisions. These will remain in place for seven days and will peel off by themselves. A few patients react to the anesthetic after surgery with nausea and vomiting. This usually lasts less than 24 hours and should be treated with lots of fluids. Dr. Irving Currie will prescribe nausea medicine for during your preoperative visit. The maximum swelling occurs at about three days and then begins to dramatically improve. Mild bruising typically resolves within 14 days. You should plan to be off work for up to five to seven days, although this can vary from person to person. Additional Post-Operative Instructions: No heavy exercise or lifting for three weeks following surgery.   This will allow the implants to remain in the proper position without movement. For the first three days following surgery you should try to restrict your arm movements. Move your arms slowly and avoid sudden jerky movements of the chest and breast area. Keep your arms as close to your body as possible. This allows the implants to remain in place. Dr. Leonor Yeager encourages walking immediately after surgery. This activity will greatly minimize the risk of blood clots in your leg veins. Do not expose your breasts to the sun for eight weeks after surgery. Please notify Dr. Leonor Yeager if: 
? If your one breast becomes significantly larger than the other ? If you develop significant bruising across the chest  
? If you experience a significant increase in pain in the breast after the pain has improved ? If you develop a temperature above 101.5° F 
? If you develop redness (like a sunburn) around your incisions If you need help or have any questions feel free to call Dr Leonor Yeager with your concerns. Dr. Leonor Yeager is on call 24 hours per day, seven days per week and has an answering service to forward calls. Breast Massage Following Breast Augmentation You will be taught how to perform the breast massage exercises during your post operative visits. The purpose of these exercises is to keep the scar tissue that forms around implants as soft as possible. By performing these exercises as described, you can help soften the internal scar, minimize the risk of significant capsular contracture and maximize the likelihood of a soft, natural feel and appearance to your breast. 
 
Begin doing the exercises two weeks after surgery. Dr. Leonor Yeager will show you the technique during your second post-operative visit. It is important to remember that slow steady massage is more effective than quick jerky movements. Don't worry about injuring the implant; you cannot cause a rupture with these exercises. ? Press the breasts slowly and maximally inwards (towards your breast bone) and hold for 10 seconds, then release. Repeat four times. ? Press the breasts apart slowly and maximally outwards and hold for 10 seconds, then release. Repeat four times. ? Repeat for downward movement. ? Repeat for upward movement. ? Perform these exercises four times per day for the first three months. The quality of your cosmetic enhancement may be compromised if you fail to return for any scheduled post-op visits, or follow the pre- and post-operative instructions. Please dont hesitate to report any unusual or concerning changes. Our number is [phone]. DISCHARGE SUMMARY from your Nurse The following personal items collected during your admission are returned to you:  
Dental Appliance: Dental Appliances: None Vision:   
Hearing Aid:   
Jewelry: Jewelry: None Clothing: Clothing: Footwear, Undergarments, Shirt, Pants Other Valuables: Other Valuables: None Valuables sent to safe:   
 
PATIENT INSTRUCTIONS: 
 
After general anesthesia or intravenous sedation, for 24 hours or while taking prescription Narcotics: · Limit your activities · Do not drive and operate hazardous machinery · Do not make important personal or business decisions · Do  not drink alcoholic beverages · If you have not urinated within 8 hours after discharge, please contact your surgeon on call. Report the following to your surgeon: 
· Excessive pain, swelling, redness or odor of or around the surgical area · Temperature over 100.5 · Nausea and vomiting lasting longer than 4 hours or if unable to take medications · Any signs of decreased circulation or nerve impairment to extremity: change in color, persistent  numbness, tingling, coldness or increase pain · Any questions [address] Breathing deep and coughing are very important exercises to do after surgery. Deep breathing and coughing open the little air tubes and air sacks in your lungs. You take deep breaths every day. You may not even notice - it is just something you do when you sigh or yawn. It is a natural exercise you do to keep these air passages open. After surgery, take deep breaths and cough, on purpose. Coughing and deep breathing help prevent bronchitis and pneumonia after surgery. If you had chest or belly surgery, use a pillow as a \"hug ricardo\" and hold it tightly to your chest or belly when you cough. DIRECTIONS: 
6. Take 10 to 15 slow deep breaths every hour while awake. 7. Breathe in deeply, and hold it for 2 seconds. 8. Exhale slowly through puckered lips, like blowing up a balloon. 9. After every 4th or 5th deep breath, hug your pillow to your chest or belly and give a hard, deep cough. Yes, it will probably hurt. But doing this exercise is very important part of healing after surgery. Take your pain medicine to help you do this exercise without too much pain. IF YOU HAVE BEEN DIAGNOSED WITH SLEEP APNEA, PLEASE USE YOUR SLEEP APNEA DEVICE OR CPAP MACHINE WHEN YOU INTEND TO NAP AFTER TAKING PAIN MEDICATION. Ankle Pumps Ankle pumps increase the circulation of oxygenated blood to your lower extremities and decrease your risk for circulation problems such as blood clots. They also stretch the muscles, tendons and ligaments in your foot and ankle, and prevent joint contracture in the ankle and foot, especially after surgeries on the legs. It is important to do ankle pump exercises regularly after surgery because immobility increases your risk for developing a blood clot. Your doctor may also have you take an Aspirin for the next few days as well. If your doctor did not ask you to take an Aspirin, consult with him before starting Aspirin therapy on your own.  
 
 
Slowly point your foot forward, feeling the muscles on the top of your lower leg stretch, and hold this position for 5 seconds. Next, pull your foot back toward you as far as possible, stretching the calf muscles, and hold that position for 5 seconds. Repeat with the other foot. Perform 10 repetitions every hour while awake for both ankles if possible (down and then up with the foot once is one repetition). You should feel gentle stretching of the muscles in your lower leg when doing this exercise. If you feel pain, or your range of motion is limited, don't  Push too hard. Only go the limit your joint and muscles will let you go. If you have increasing pain, progressively worsening leg warmth or swelling, STOP the exercise and call your doctor. Below is information about the medications your doctor is prescribing after your visit: 
 
Other information in your discharge envelope: 
[]     PRESCRIPTIONS []     PHYSICAL THERAPY PRESCRIPTION 
[]     APPOINTMENT CARDS []     Regional Anesthesia Pamphlet for block or block with On-Q Catheter from Anesthesia Service []     Medical device information sheets/pamphlets from their   
[]     School/work excuse note. []     /parent work excuse note. These are general instructions for a healthy lifestyle: *  Please give a list of your current medications to your Primary Care Provider. *  Please update this list whenever your medications are discontinued, doses are 
    changed, or new medications (including over-the-counter products) are added. *  Please carry medication information at all times in case of emergency situations. About Smoking No smoking / No tobacco products / Avoid exposure to second hand smoke Surgeon General's Warning:  Quitting smoking now greatly reduces serious risk to your health. Obesity, smoking, and sedentary lifestyle greatly increases your risk for illness and disease.   A healthy diet, regular physical exercise & weight monitoring are important for maintaining a healthy lifestyle. Congestive Heart Failure You may be retaining fluid if you have a history of heart failure or if you experience any of the following symptoms:  Weight gain of 3 pounds or more overnight or 5 pounds in a week, increased swelling in our hands or feet or shortness of breath while lying flat in bed. Please call your doctor as soon as you notice any of these symptoms; do not wait until your next office visit. Recognize signs and symptoms of STROKE: 
F - face looks uneven A - arms unable to move or move even S - speech slurred or non-existent T - time-call 911 as soon as signs and symptoms begin-DO NOT go Back to bed or wait to see if you get better-TIME IS BRAIN. Warning signs of HEART ATTACK Call 911 if you have these symptoms · Chest discomfort. Most heart attacks involve discomfort in the center of the chest that lasts more than a few minutes, or that goes away and comes back. It can feel like uncomfortable pressure, squeezing, fullness, or pain. · Discomfort in other areas of the upper body. Symptoms can include pain or discomfort in one or both Arms, the back, neck, jaw, or stomach. ·  Shortness of breath with or without chest discomfort. · Other signs may include breaking out in a cold sweat, nausea, or lightheadedness Don't wait more than five minutes to call 911 - MINUTES MATTER! Fast action can save your life. Calling 911 is almost always the fastest way to get lifesaving treatment. Emergency Medical Services staff can begin treatment when they arrive - up to an hour sooner than if someone gets to the hospital by car. Introducing [facility] & HEALTH SERVICES! Rima Martins introduces Cle[employer]ize patient portal. Now you can access parts of your medical record, email your doctor's office, and request medication refills online.    
 
1. In your internet browser, go to [email or web address] 2. Click on the First Time User? Click Here link in the Sign In box. You will see the New Member Sign Up page. 3. Enter your SocialMeter[employer] Access Code exactly as it appears below. You will not need to use this code after youve completed the sign-up process. If you do not sign up before the expiration date, you must request a new code. · SocialMeterTV Access Code: [email or web address] Expires: 12/9/2018  4:55 PM 
 
4. Enter the last four digits of your Social Security Number (xxxx) and Date of Birth (mm/dd/yyyy) as indicated and click Submit. You will be taken to the next sign-up page. 5. Create a TouristWayt ID. This will be your SocialMeterTV login ID and cannot be changed, so think of one that is secure and easy to remember. 6. Create a SocialMeterTV password. You can change your password at any time. 7. Enter your Password Reset Question and Answer. This can be used at a later time if you forget your password. 8. Enter your e-mail address. You will receive e-mail notification when new information is available in [address] 9. Click Sign Up. You can now view and download portions of your medical record. 10. Click the Download Summary menu link to download a portable copy of your medical information. If you have questions, please visit the Frequently Asked Questions section of the [employer]MeterTV website. Remember, SocialMeterTV is NOT to be used for urgent needs. For medical emergencies, dial 911. Now available from your iPhone and Android! Introducing Carlos Bass As a [facility] patient, I wanted to make you aware of our electronic visit tool called Carlos Bass. [facility] 24/7 allows you to connect within minutes with a medical provider 24 hours a day, seven days a week via a mobile device or tablet or logging into a secure website from your computer. You can access Carlos Bass from anywhere in the [place]. A virtual visit might be right for you when you have a simple condition and feel like you just dont want to get out of bed, or cant get away from work for an appointment, when your regular Deanne Blackburn provider is not available (evenings, weekends or holidays), or when youre out of town and need minor care. Electronic visits cost only $49 and if the [employer] 24/7 provider determines a prescription is needed to treat your condition, one can be electronically transmitted to a nearby pharmacy*. Please take a moment to enroll today if you have not already done so. The enrollment process is free and takes just a few minutes. To enroll, please download the [employer] 24/7 nilson to your tablet or phone, or visit [email or web address] to enroll on your computer. And, as an [address] [place] [address] patient with a [employer] account, the results of your visits will be scanned into your electronic medical record and your primary care provider will be able to view the scanned results. We urge you to continue to see your regular Deanne Blackburn provider for your ongoing medical care. And while your primary care provider may not be the one available when you seek a Carlos Bass virtual visit, the peace of mind you get from getting a real diagnosis real time can be priceless. For more information on Carlos Bass, view our Frequently Asked Questions (FAQs) at [email or web address]. Sincerely, 
 
Dion Trent MD 
Chief Medical Officer Omar Nye *:  certain medications cannot be prescribed via Carlos Bass Providers Seen During Your Hospitalization Provider Specialty Primary office phone Prachi Leyva MD Plastic Surgery [phone] Your Primary Care Physician (PCP) Primary Care Physician Office Phone Office Fax Amy Salmon [phone] You are allergic to the following Allergen Reactions Codeine-Cpm-Pe-Acetaminophen Other (comments) Hyper active Recent Documentation Height Weight BMI OB Status Smoking Status 1.803 m 70.3 kg 21.62 kg/m2 Having regular periods Never Smoker Emergency Contacts Name Discharge Info Relation Home Work Mobile Rena Hester DISCHARGE CAREGIVER [3] Mother [14] [phone] Patient Belongings The following personal items are in your possession at time of discharge: 
  Dental Appliances: None         Home Medications: None   Jewelry: None  Clothing: Footwear, Undergarments, Shirt, Pants    Other Valuables: None Please provide this summary of care documentation to your next provider. Signatures-by signing, you are acknowledging that this After Visit Summary has been reviewed with you and you have received a copy. Patient Signature:  ____________________________________________________________ Date:  ____________________________________________________________  
  
[facility] Provider Signature:  ____________________________________________________________ Date:  ____________________________________________________________

## 2018-09-14 NOTE — OP NOTES
[address] REPORT    Maribel Noel  MR#: [id number]  : 1985  ACCOUNT #: [de-identified]   DATE OF SERVICE: 2018    PROCEDURES PERFORMED:  Bilateral breast augmentation. PREOPERATIVE DIAGNOSIS:  Micromastia. POSTOPERATIVE DIAGNOSIS:  Micromastia. SURGEON:  Elo Weiss MD    ASSISTANT:  King Blaire PA-C    ANESTHESIA:  General endotracheal.    ESTIMATED BLOOD LOSS:  Minimal.    DRAINS:  None. SPECIMENS REMOVED:  None. FINDINGS:  None. IMPLANTS:  See note. COMPLICATIONS:  None. COUNTS:  Correct x 2. DISPOSITION:  Stable to PACU.    BRIEF HISTORY:  The patient is a 72-year-old female presenting for cosmetic breast augmentation. Bilateral breast augmentation via an inframammary submuscular approach using smooth round silicone implants is offered. The overall procedure, incisional approach, expected outcomes and recovery were discussed. The risks, benefits and alternatives to the procedure including but not limited to bleeding, infection, damage to surrounding tissue structures, the need for revisional surgery, seroma, hematoma, capsular contracture, implant rupture, implant deflation, the need for future implant maintenance and surveillance MRIs, partial or total loss of sensation to the nipple, inability to breastfeed, hypertrophic scarring and asymmetry were discussed. A postoperative cup size cannot be guaranteed. The patient agreed to proceed. PROCEDURE NOTE:  Preoperative markings were made with the patient in the standing position and informed consent was obtained. The patient was taken to the operating room and placed supine on the operating table. Sequential compression boots were placed. General endotracheal anesthesia was obtained. A lower body Tsering Hugger was placed. The chest was prepped and draped in the usual sterile fashion.   The preoperative markings were injected with 40 mL of 0.25% lidocaine with 1:400,000 epinephrine. Bilateral inframammary incisions were designed 4 cm in length. The right incision was made sharply. Dissection carried down through the subcutaneous tissue and Nichelle fascia in a superomedial de guzman direction creating a stair-step incision pattern. The inferior medial border of the pectoralis major muscle was identified along the anterior surface of the rib and incised. The subpectoral space was then entered bluntly and dissected superiorly and laterally. Using the lighted retractor, the subpectoral space was explored. Hemostasis was secured. The inferior lateral border of the muscle was released from the chest wall. The pocket was then examined. Hemostasis was secured. The pocket was irrigated with 500 mL of half-strength Betadine solution, 2 liters of triple antibiotic solution and 10 mL of 0.5% Marcaine with epinephrine. Gloves were changed. An Crashmob style  mL implant, serial #[id number] was presoaked in triple antibiotic solution and placed within the right breast pocket via a Ratliff funnel. Orientation was confirmed and the breast wound was closed. The inframammary incision was tacked down to the anterior chest wall fascia with interrupted 2-0 PDS sutures from Nichelle fascia to the chest wall fascia followed by running 2-0 PDS on Nichelle fascia and the deep subcutaneous tissue, running deep dermal 3-0 Monocryl and a running subcuticular 4-0 Monocryl in the skin. The same procedure was repeated on the left with implant serial #[id number]. Symmetry and volume were satisfactory in the reclining position. The patient was placed supine. The wounds were then dressed with Dermabond, 4 x 4's and Medipore tape. A surgical bra was placed. Anesthesia was then discontinued. The patient was extubated in the operating room having tolerated the procedure well. The needle, instrument and laparotomy pad counts at the end of the case were correct.       MD Joan Kelly / [place]  D: 09/14/2018 09:58     T: 09/14/2018 10:12  JOB #: [id number]

## 2019-09-25 ENCOUNTER — ED HISTORICAL/CONVERTED ENCOUNTER (OUTPATIENT)
Dept: OTHER | Age: 34
End: 2019-09-25

## 2019-10-04 ENCOUNTER — ED HISTORICAL/CONVERTED ENCOUNTER (OUTPATIENT)
Dept: OTHER | Age: 34
End: 2019-10-04

## 2021-02-19 ENCOUNTER — OFFICE VISIT (OUTPATIENT)
Dept: PRIMARY CARE CLINIC | Age: 36
End: 2021-02-19
Payer: COMMERCIAL

## 2021-02-19 VITALS
SYSTOLIC BLOOD PRESSURE: 110 MMHG | OXYGEN SATURATION: 99 % | TEMPERATURE: 97.6 F | HEART RATE: 67 BPM | DIASTOLIC BLOOD PRESSURE: 78 MMHG

## 2021-02-19 DIAGNOSIS — Z13.89 SCREENING FOR CARDIOVASCULAR, RESPIRATORY, AND GENITOURINARY DISEASES: Primary | ICD-10-CM

## 2021-02-19 DIAGNOSIS — R09.81 NASAL SINUS CONGESTION: ICD-10-CM

## 2021-02-19 DIAGNOSIS — J02.9 SORE THROAT: ICD-10-CM

## 2021-02-19 DIAGNOSIS — Z13.83 SCREENING FOR CARDIOVASCULAR, RESPIRATORY, AND GENITOURINARY DISEASES: Primary | ICD-10-CM

## 2021-02-19 DIAGNOSIS — R05.9 COUGH: ICD-10-CM

## 2021-02-19 DIAGNOSIS — Z13.6 SCREENING FOR CARDIOVASCULAR, RESPIRATORY, AND GENITOURINARY DISEASES: Primary | ICD-10-CM

## 2021-02-19 PROCEDURE — 99202 OFFICE O/P NEW SF 15 MIN: CPT | Performed by: NURSE PRACTITIONER

## 2021-02-19 RX ORDER — GUAIFENESIN 600 MG/1
600 TABLET, EXTENDED RELEASE ORAL 2 TIMES DAILY
Qty: 30 TAB | Refills: 0 | Status: SHIPPED | OUTPATIENT
Start: 2021-02-19

## 2021-02-19 RX ORDER — IPRATROPIUM BROMIDE 42 UG/1
2 SPRAY, METERED NASAL
Qty: 15 ML | Refills: 0 | Status: SHIPPED | OUTPATIENT
Start: 2021-02-19

## 2021-02-19 RX ORDER — FLUTICASONE PROPIONATE 50 MCG
SPRAY, SUSPENSION (ML) NASAL
Qty: 1 BOTTLE | Refills: 1 | Status: SHIPPED | OUTPATIENT
Start: 2021-02-19

## 2021-02-19 RX ORDER — SOD CHLOR,BICARB/SQUEEZ BOTTLE
1 PACKET, WITH RINSE DEVICE NASAL 2 TIMES DAILY
Qty: 1 EACH | Refills: 1 | Status: SHIPPED | OUTPATIENT
Start: 2021-02-19

## 2021-02-19 NOTE — PROGRESS NOTES
Ayaz Navas is a 37 y.o. female who was seen in clinic today (2/19/2021) for an acute visit. Assessment/Plan:            * COVID-19 sample collected and submitted       * Patient given detailed CDC instructions contained within After Visit Summary    Diagnoses and all orders for this visit:    1. Screening for cardiovascular, respiratory, and genitourinary diseases  -     NOVEL CORONAVIRUS (COVID-19)    2. Cough  -     NOVEL CORONAVIRUS (COVID-19)  -     guaiFENesin ER (MUCINEX) 600 mg ER tablet; Take 1 Tab by mouth two (2) times a day. Indications: cold symptoms, cough    3. Sore throat  -     NOVEL CORONAVIRUS (COVID-19)    4. Nasal sinus congestion  -     ipratropium (ATROVENT) 42 mcg (0.06 %) nasal spray; 2 Sprays by Both Nostrils route four (4) times daily as needed for Rhinitis. May use up to three weeks consecutively. Indications: runny nose  -     fluticasone propionate (FLONASE) 50 mcg/actuation nasal spray; 1-2 sprays each nostril daily; place tip of nozzle in nose, aim towards same side sinus/eye, sniff gently and depress quickly, do not swallow. Indications: inflammation of the nose due to an allergy  -     sod chlor-bicarb-squeez bottle (Neilmed Sinus Rinse Complete) pkdv; 1 Kit by sinus irrigation route two (2) times a day. Indications: stuffy nose       known covid exposure. Discussed expected course/resolution/complications of diagnosis in detail with patient. Advised pt on CDC guidance, OTC medications for symptom management, red flags reviewed and should develop to seek emergency medical attn. Reviewed with her that COVID-19 pandemic is an evolving situation with rapidly changing recommendations & guidelines, continue to practice hand hygiene, social distancing, wearing of facial coverings. Regardless of testing results, should still follow CDC guidelines as there is a chance of a false negative, such as a poor sample collection or being too soon to test after exposure.   Medical decisions are made based on the the best information available at the time. Recommended she stay tuned for updates published by trusted sources and to advise your PCP of any unexpected changes in clinical condition     Subjective:   Sheryle Dus was seen today for Cough (Patient reports + COVID contact approximately 12 days ago, has had \"sinus issues\", cough from PN drip, and sore throat that began yesterday. ), Concern For COVID-19 (Coronavirus), Sore Throat, and Nasal Congestion. She denies a recent history/current: loss of smell/taste, fever, wheezing, SOB, and COOK. Non user of tob. Travel Screening     Question   Response    In the last month, have you been in contact with someone who was confirmed or suspected to have Coronavirus / COVID-19? Yes    Have you had a COVID-19 viral test in the last 14 days? No    Do you have any of the following new or worsening symptoms? Cough;Runny nose; Sore throat    Have you traveled internationally in the last month? No      Travel History   Travel since 01/19/21     No documented travel since 01/19/21          ROS - Pertinent items are noted in HPI    Patient Active Problem List   Diagnosis Code    Secondary hypertension I15.9    Abnormal thyroid blood test R79.89     Home Medications    Medication Sig Start Date End Date Taking? Authorizing Provider   GABAPENTIN PO Take  by mouth. Yes Provider, Historical   trazodone HCl (TRAZODONE PO) Take  by mouth. Yes Provider, Historical   ipratropium (ATROVENT) 42 mcg (0.06 %) nasal spray 2 Sprays by Both Nostrils route four (4) times daily as needed for Rhinitis. May use up to three weeks consecutively. Indications: runny nose 2/19/21  Yes Radha Green, NP   guaiFENesin ER (MUCINEX) 600 mg ER tablet Take 1 Tab by mouth two (2) times a day.  Indications: cold symptoms, cough 2/19/21  Yes Radha Green, NP   fluticasone propionate (FLONASE) 50 mcg/actuation nasal spray 1-2 sprays each nostril daily; place tip of nozzle in nose, aim towards same side sinus/eye, sniff gently and depress quickly, do not swallow. Indications: inflammation of the nose due to an allergy 2/19/21  Yes [place] Him, NP   sod chlor-bicarb-squeez bottle (Neilmed Sinus Rinse Complete) pkdv 1 Kit by sinus irrigation route two (2) times a day. Indications: stuffy nose 2/19/21  Yes Dylan Castellanos, NP   buPROPion XL (WELLBUTRIN XL) 150 mg tablet Take 150 mg by mouth every morning. Yes Provider, Historical   desvenlafaxine succinate (PRISTIQ) 50 mg ER tablet TK 1 T PO D 8/29/17   Provider, Historical   NORLYDA 0.35 mg tab TK 1 T PO QD 9/19/17   Provider, Historical      Allergies   Allergen Reactions    Codeine-Cpm-Pe-Acetaminophen Other (comments)     Hyper active          Objective:   Physical Exam  General:  alert, cooperative, no distress   Ears: Normal external ear canals AU   Sinuses: Mild maxillary sinus TTP   Neck: supple, symmetrical, trachea midline. Oropharynx: erythema without exudate   Heart: normal rate, regular rhythm   Lungs: No dyspneic or audible respiratory distress. Visit Vitals  /78 (BP 1 Location: Left upper arm, BP Patient Position: Sitting)   Pulse 67   Temp 97.6 °F (36.4 °C) (Skin)   SpO2 99%         Disclaimer:        Medication risks/benefits/costs/interactions/alternatives discussed with patient. She was given an after visit summary which includes diagnoses, current medications, & vitals. She expressed understanding with the diagnosis and plan. Aspects of this note may have been generated using voice recognition software. Despite editing, there may be some syntax errors.        Jazlyn Alexander NP

## 2021-02-20 LAB — SARS-COV-2, NAA: NOT DETECTED

## 2021-02-22 ENCOUNTER — PATIENT MESSAGE (OUTPATIENT)
Dept: PRIMARY CARE CLINIC | Age: 36
End: 2021-02-22

## 2022-03-18 PROBLEM — R79.89 ABNORMAL THYROID BLOOD TEST: Status: ACTIVE | Noted: 2017-09-28

## 2022-03-19 PROBLEM — I15.9 SECONDARY HYPERTENSION: Status: ACTIVE | Noted: 2017-09-25

## 2023-05-18 RX ORDER — ACETAMINOPHEN AND CODEINE PHOSPHATE 120; 12 MG/5ML; MG/5ML
1 SOLUTION ORAL DAILY
COMMUNITY
Start: 2017-09-19

## 2023-05-18 RX ORDER — FLUTICASONE PROPIONATE 50 MCG
SPRAY, SUSPENSION (ML) NASAL
COMMUNITY
Start: 2021-02-19

## 2023-05-18 RX ORDER — BUPROPION HYDROCHLORIDE 150 MG/1
150 TABLET ORAL
COMMUNITY

## 2023-05-18 RX ORDER — IPRATROPIUM BROMIDE 42 UG/1
2 SPRAY, METERED NASAL 4 TIMES DAILY PRN
COMMUNITY
Start: 2021-02-19

## 2023-05-18 RX ORDER — GUAIFENESIN 600 MG/1
600 TABLET, EXTENDED RELEASE ORAL 2 TIMES DAILY
COMMUNITY
Start: 2021-02-19

## 2023-05-18 RX ORDER — DESVENLAFAXINE SUCCINATE 50 MG/1
1 TABLET, EXTENDED RELEASE ORAL DAILY
COMMUNITY
Start: 2017-08-29

## (undated) DEVICE — LIGHT HANDLE: Brand: DEVON

## (undated) DEVICE — BRA SURG POST-OP MED 38IN --

## (undated) DEVICE — SUTURE MCRYL SZ 3-0 L27IN ABSRB UD L26MM SH 1/2 CIR Y416H

## (undated) DEVICE — DRAPE,CHEST,FENES,15X10,STERIL: Brand: MEDLINE

## (undated) DEVICE — STAPLER SKIN H3.9MM WIRE DIA0.58MM CRWN 6.9MM 35 STPL ROT

## (undated) DEVICE — INFECTION CONTROL KIT SYS

## (undated) DEVICE — Z DISCONTINUEDSOLUTION PREP 2OZ 10% POVIDONE IOD SCR CAP BTL

## (undated) DEVICE — BLADE ELECTRODE: Brand: EDGE

## (undated) DEVICE — SUTURE GORTX SZ 0 L36IN NONABSORBABLE L22MM TH-22 1/2 CIR 2N03A

## (undated) DEVICE — STERILE POLYISOPRENE POWDER-FREE SURGICAL GLOVES: Brand: PROTEXIS

## (undated) DEVICE — GAUZE SPONGES,12 PLY: Brand: CURITY

## (undated) DEVICE — BRA SURG POST-OP LG 42IN --

## (undated) DEVICE — SYSTEM IMPL DEL FOR BRST IMPL FUN (SEE COMMENT)

## (undated) DEVICE — STERILE POLYISOPRENE POWDER-FREE SURGICAL GLOVES WITH EMOLLIENT COATING: Brand: PROTEXIS

## (undated) DEVICE — KENDALL SCD EXPRESS SLEEVES, KNEE LENGTH, MEDIUM: Brand: KENDALL SCD

## (undated) DEVICE — APPLICATOR BNDG 1MM ADH PREMIERPRO EXOFIN

## (undated) DEVICE — SUTURE MCRYL SZ 5-0 L18IN ABSRB UD L19MM PS-2 3/8 CIR PRIM Y495G

## (undated) DEVICE — ELECTRODE NDL 2.8IN COAT VALLEYLAB

## (undated) DEVICE — DRAPE FLD WRM W44XL66IN C6L FOR INTRATEMP SYS THERMABASIN

## (undated) DEVICE — Z DISCONTINUED PER MEDLINE PACK PROCEDURE SURG PLAS

## (undated) DEVICE — SUTURE PDS II SZ 3-0 L27IN ABSRB VLT L26MM SH 1/2 CIR Z316H

## (undated) DEVICE — DEVON™ KNEE AND BODY STRAP 60" X 3" (1.5 M X 7.6 CM): Brand: DEVON

## (undated) DEVICE — SUTURE PDS II SZ 2-0 L27IN ABSRB VLT SH L26MM 1/2 CIR Z317H

## (undated) DEVICE — SOLUTION SCRB 4OZ 10% PVP I POVIDONE IOD TOP PAINT EXIDINE

## (undated) DEVICE — 3M™ BAIR HUGGER® UNDERBODY BLANKET, FULL ACCESS, 10 PER CASE 63500: Brand: BAIR HUGGER™

## (undated) DEVICE — 3000CC GUARDIAN II: Brand: GUARDIAN

## (undated) DEVICE — BANDAGE COMPR SELF ADH 5 YDX4 IN TAN STRL PREMIERPRO LF

## (undated) DEVICE — SOLUTION IV 1000ML 0.9% SOD CHL

## (undated) DEVICE — Z DISCONTINUED USE (MFG CAT 7984-37) SOLUTION IV SODIUM CHL 0.9% 100 ML INJ

## (undated) DEVICE — INTENT TO BE USED WITH SUTURE MATERIAL FOR TISSUE CLOSURE: Brand: RICHARD-ALLAN® NEEDLE STRAIGHT CUTTING

## (undated) DEVICE — SUTURE MCRYL SZ 4-0 L27IN ABSRB UD L19MM PS-2 1/2 CIR PRIM Y426H

## (undated) DEVICE — MEDI-VAC NON-CONDUCTIVE SUCTION TUBING: Brand: CARDINAL HEALTH

## (undated) DEVICE — TRAY PREP DRY W/ PREM GLV 2 APPL 6 SPNG 2 UNDPD 1 OVERWRAP